# Patient Record
Sex: FEMALE | Race: WHITE | NOT HISPANIC OR LATINO | URBAN - METROPOLITAN AREA
[De-identification: names, ages, dates, MRNs, and addresses within clinical notes are randomized per-mention and may not be internally consistent; named-entity substitution may affect disease eponyms.]

---

## 2019-10-29 ENCOUNTER — EMERGENCY (EMERGENCY)
Facility: HOSPITAL | Age: 39
LOS: 1 days | Discharge: HOME | End: 2019-10-29
Attending: EMERGENCY MEDICINE | Admitting: EMERGENCY MEDICINE
Payer: COMMERCIAL

## 2019-10-29 VITALS
OXYGEN SATURATION: 99 % | RESPIRATION RATE: 16 BRPM | DIASTOLIC BLOOD PRESSURE: 68 MMHG | HEART RATE: 78 BPM | TEMPERATURE: 98 F | SYSTOLIC BLOOD PRESSURE: 114 MMHG

## 2019-10-29 VITALS — DIASTOLIC BLOOD PRESSURE: 71 MMHG | SYSTOLIC BLOOD PRESSURE: 113 MMHG | HEIGHT: 67 IN | WEIGHT: 130.07 LBS

## 2019-10-29 PROCEDURE — 99281 EMR DPT VST MAYX REQ PHY/QHP: CPT

## 2019-10-29 PROCEDURE — 99282 EMERGENCY DEPT VISIT SF MDM: CPT

## 2019-10-29 NOTE — ED PROVIDER NOTE - NSFOLLOWUPINSTRUCTIONS_ED_ALL_ED_FT
Return to the ED for any new or worsening symptoms  Take your medication as prescribed  Do not disrupt the clot that has now formed  If you note some continued bleeding rinse with cold water and apply pressure to site with gauze  Follow up with your dentist today as scheduled   Advance activity as tolerated

## 2019-10-29 NOTE — ED PROVIDER NOTE - OBJECTIVE STATEMENT
Pt is a 40 yo female who presents to the ED with a cc of bleeding from her gums.  Pt has no significant past medical history.  Pt reports that on Friday she underwent a gum resection.  Pt tolerated the procedure well.  She has been taking Advil prn for pain but reports that otherwise she takes no medications.  Today shortly after waking up pt reports that she noted she began to bleed from her gums.  She reports that it appeared to be a significant amount of bleeding and she could not get it to stop.  Pt rinsed with warm water and attempted to call her surgeon.  After no success she came to the ED for further work up.  Denies lightheadedness, N/V, CP, SOB, abd pain.  Pt denies chance of pregnancy

## 2019-10-29 NOTE — ED PROVIDER NOTE - ENMT, MLM
Airway patent, Nasal mucosa clear. No active gum bleeding noted at this time  Sutures noted to inferior medial gum, clot formed to inside of left upper gum region with no active bleeding at this time

## 2019-10-29 NOTE — ED PROVIDER NOTE - PATIENT PORTAL LINK FT
You can access the FollowMyHealth Patient Portal offered by [facility] by registering at the following website: [email or web address]. By joining [employer]’s FollowMyHealth portal, you will also be able to view your health information using other applications (apps) compatible with our system.

## 2019-10-29 NOTE — ED PROVIDER NOTE - CLINICAL SUMMARY MEDICAL DECISION MAKING FREE TEXT BOX
Pt is a 40 yo female who presents to the ED with a cc of bleeding from her gums.  Pt has no significant past medical history.  Pt reports that on Friday she underwent a gum resection.  Pt tolerated the procedure well.  She has been taking Advil prn for pain but reports that otherwise she takes no medications.  Today shortly after waking up pt reports that she noted she began to bleed from her gums.  She reports that it appeared to be a significant amount of bleeding and she could not get it to stop.  Pressure applied to site with clot formation.   Pt with no active bleeding.  Has follow up with surgeon will discharge home

## 2019-10-29 NOTE — ED PROVIDER NOTE - PROGRESS NOTE DETAILS
gum bleeding has since stopped at this time with clot formation.  Pt was able to reach her surgeon and he will see her in the office today at 8:30 am.  Pt stable for discharge.  Advised not to brush teeth

## 2019-10-29 NOTE — ED ADULT NURSE NOTE - OBJECTIVE STATEMENT
Pt received alert and oriented x 4, c/o bleeding gums. Pt reports gum resection on Friday.  Pt states: "I think my stitch popped this morning". Pt denies any pain or discomfort.

## 2019-10-29 NOTE — ED PROVIDER NOTE - CHIEF COMPLAINT
The patient is a 39y Female complaining of The patient is a 39y Female complaining of bleeding from gums

## 2020-04-14 ENCOUNTER — EMERGENCY (EMERGENCY)
Facility: HOSPITAL | Age: 40
LOS: 1 days | Discharge: HOME | End: 2020-04-14
Attending: EMERGENCY MEDICINE | Admitting: EMERGENCY MEDICINE
Payer: COMMERCIAL

## 2020-04-14 VITALS
OXYGEN SATURATION: 98 % | WEIGHT: 134.92 LBS | SYSTOLIC BLOOD PRESSURE: 111 MMHG | DIASTOLIC BLOOD PRESSURE: 73 MMHG | HEIGHT: 65 IN | RESPIRATION RATE: 18 BRPM | HEART RATE: 67 BPM | TEMPERATURE: 98 F

## 2020-04-14 PROCEDURE — 87635 SARS-COV-2 COVID-19 AMP PRB: CPT

## 2020-04-14 PROCEDURE — 99283 EMERGENCY DEPT VISIT LOW MDM: CPT

## 2020-04-14 PROCEDURE — 71045 X-RAY EXAM CHEST 1 VIEW: CPT | Mod: 26

## 2020-04-14 PROCEDURE — 71045 X-RAY EXAM CHEST 1 VIEW: CPT

## 2020-04-14 PROCEDURE — 99283 EMERGENCY DEPT VISIT LOW MDM: CPT | Mod: 25

## 2020-04-14 RX ORDER — ACETAMINOPHEN 500 MG
650 TABLET ORAL ONCE
Refills: 0 | Status: COMPLETED | OUTPATIENT
Start: 2020-04-14 | End: 2020-04-14

## 2020-04-14 RX ORDER — ALBUTEROL 90 UG/1
2 AEROSOL, METERED ORAL
Qty: 1 | Refills: 0
Start: 2020-04-14 | End: 2020-04-23

## 2020-04-14 RX ADMIN — Medication 650 MILLIGRAM(S): at 18:43

## 2020-04-14 RX ADMIN — Medication 100 MILLIGRAM(S): at 18:43

## 2020-04-14 NOTE — ED PROVIDER NOTE - PATIENT PORTAL LINK FT
You can access the FollowMyHealth Patient Portal offered by [facility] by registering at the following website: [email or web address]. By joining [employer]’s FollowMyHealth portal, you will also be able to view your health information using other applications (apps) compatible with our system.

## 2020-04-14 NOTE — ED PROVIDER NOTE - OBJECTIVE STATEMENT
39 y female presents with cough, sob, fever x 4 days, nonsmoker, denies sick contacts , no pmh. states she has been taking otc tylenol for fever.  no nvd.   PMD Dr Butcher  states she is not pregnant

## 2020-04-14 NOTE — ED PROVIDER NOTE - NSFOLLOWUPINSTRUCTIONS_ED_ALL_ED_FT
Follow pre-printed discharge instructions provided containing infromation on self-quarantine and monitoring.    Rest.  Fluids.    Tylenol 1-2 tablets every 6 hours as needed for fever.    Practice good hand hygiene.    Follow up with your primary care physician.    Please return to ER immediately for trouble breathing, shortness of breath, or any other problems or concerns arise.  Please call ahead if possible.

## 2020-04-14 NOTE — ED PROVIDER NOTE - PROGRESS NOTE DETAILS
cxr clear, covid 19 test sent out, advised patient self quarantine,  follow up with pmd, stated rx zpak sent to pharmacy by urgent care, rx tessalon perles, proventil inhaler sent to pharmacy, advised if sob worsens, symptoms worsens, return to ED , hydrate, otc tylenol for fever.

## 2020-04-14 NOTE — ED PROVIDER NOTE - PROVIDER TOKENS
FREE:[LAST:[PMD Dr Butcher],PHONE:[(   )    -],FAX:[(   )    -],FOLLOWUP:[Routine],ESTABLISHEDPATIENT:[T]]

## 2020-04-14 NOTE — ED PROVIDER NOTE - CLINICAL SUMMARY MEDICAL DECISION MAKING FREE TEXT BOX
cough, fever, sob with cough x 4 days, no sick contacts, will obtain cxr, rx meds,  follow up with pmd

## 2020-04-14 NOTE — ED PROVIDER NOTE - ATTENDING CONTRIBUTION TO CARE
Pt seen and examined and d/w PA.  agree with a and p.  pt is a 38 yo female no sign hx. pt states since saturday with fever, chills, uri sx and cough. pt states went to [facility] yesterday and started on z pack but they refused to do covid test as she wasn't "elderly".  pt today feels like she cant get a full breath in and came to er.   on exam vss, sats wnl,  lungs cta, mmm, no c/c/e,  cxr neg, check covid test, d/c on home quarentine with corona instructions

## 2020-04-15 PROBLEM — Z78.9 OTHER SPECIFIED HEALTH STATUS: Chronic | Status: ACTIVE | Noted: 2019-10-29

## 2020-04-15 LAB — SARS-COV-2 RNA SPEC QL NAA+PROBE: SIGNIFICANT CHANGE UP

## 2022-05-14 NOTE — ED PROVIDER NOTE - NSCAREINITIATED _GEN_ER
Patient with one or more new problems requiring additional work-up/treatment. Karma Funes(Attending)

## 2023-02-06 NOTE — ED PROVIDER NOTE - NSDCPRINTRESULTS_ED_ALL_ED
----- Message from Sergey Grant MD sent at 2/6/2023  7:02 AM EST -----  Please let Debbie know that her histoplasmosis titer is negative her TB study was also negative.  This is what we want for starting her other medication   Patient requests all Lab and Radiology Results on their Discharge Instructions

## 2023-05-16 NOTE — ED PROVIDER NOTE - MOUTH/THROAT [-], MLM
SURVEY:    You may be receiving a survey from [employer] regarding your visit today. Please complete the survey to enable us to provide the highest quality of care to you and your family. If you cannot score us a very good on any question, please call the office to discuss how we could have made your experience a very good one. Thank you.
no pain/no difficulty in swallowing/no hoarseness

## 2025-02-24 ENCOUNTER — NON-APPOINTMENT (OUTPATIENT)
Age: 45
End: 2025-02-24